# Patient Record
Sex: FEMALE
[De-identification: names, ages, dates, MRNs, and addresses within clinical notes are randomized per-mention and may not be internally consistent; named-entity substitution may affect disease eponyms.]

---
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
## 2023-04-06 ENCOUNTER — APPOINTMENT (RX ONLY)
Dept: URBAN - METROPOLITAN AREA CLINIC 15 | Facility: CLINIC | Age: 23
Setting detail: DERMATOLOGY
End: 2023-04-06

## 2023-04-06 VITALS — HEIGHT: 63 IN | WEIGHT: 115 LBS

## 2023-04-06 DIAGNOSIS — L57.8 OTHER SKIN CHANGES DUE TO CHRONIC EXPOSURE TO NONIONIZING RADIATION: ICD-10-CM

## 2023-04-06 DIAGNOSIS — L71.8 OTHER ROSACEA: ICD-10-CM

## 2023-04-06 DIAGNOSIS — D18.0 HEMANGIOMA: ICD-10-CM

## 2023-04-06 PROBLEM — D18.01 HEMANGIOMA OF SKIN AND SUBCUTANEOUS TISSUE: Status: ACTIVE | Noted: 2023-04-06

## 2023-04-06 PROCEDURE — ? PRESCRIPTION

## 2023-04-06 PROCEDURE — 99203 OFFICE O/P NEW LOW 30 MIN: CPT

## 2023-04-06 PROCEDURE — ? PRESCRIPTION MEDICATION MANAGEMENT

## 2023-04-06 PROCEDURE — ? COUNSELING

## 2023-04-06 RX ORDER — IVERMECTIN 10 MG/G
1 CREAM TOPICAL QAM
Qty: 45 | Refills: 3 | Status: ERX | COMMUNITY
Start: 2023-04-06

## 2023-04-06 RX ORDER — SULFACETAMIDE SODIUM AND SULFUR 100; 50 MG/G; MG/G
1 CREAM TOPICAL QD
Qty: 340 | Refills: 0 | Status: ERX | COMMUNITY
Start: 2023-04-06

## 2023-04-06 RX ORDER — DAPSONE 50 MG/G
1 GEL TOPICAL QHS
Qty: 90 | Refills: 3 | Status: ERX | COMMUNITY
Start: 2023-04-06

## 2023-04-06 RX ORDER — DESONIDE 0.5 MG/ML
1 LOTION TOPICAL BID
Qty: 59 | Refills: 1 | Status: ERX | COMMUNITY
Start: 2023-04-06

## 2023-04-06 RX ADMIN — DESONIDE 1: 0.5 LOTION TOPICAL at 00:00

## 2023-04-06 RX ADMIN — SULFACETAMIDE SODIUM AND SULFUR 1: 100; 50 CREAM TOPICAL at 00:00

## 2023-04-06 RX ADMIN — IVERMECTIN 1: 10 CREAM TOPICAL at 00:00

## 2023-04-06 RX ADMIN — DAPSONE 1: 50 GEL TOPICAL at 00:00

## 2023-04-06 ASSESSMENT — LOCATION DETAILED DESCRIPTION DERM
LOCATION DETAILED: LEFT INFERIOR CENTRAL MALAR CHEEK
LOCATION DETAILED: LEFT FOREHEAD
LOCATION DETAILED: RIGHT PROXIMAL DORSAL FOREARM
LOCATION DETAILED: RIGHT INFERIOR CENTRAL MALAR CHEEK
LOCATION DETAILED: LEFT FOREHEAD
LOCATION DETAILED: LEFT PROXIMAL DORSAL FOREARM
LOCATION DETAILED: RIGHT INFERIOR CENTRAL MALAR CHEEK
LOCATION DETAILED: LEFT INFERIOR CENTRAL MALAR CHEEK

## 2023-04-06 ASSESSMENT — LOCATION SIMPLE DESCRIPTION DERM
LOCATION SIMPLE: LEFT CHEEK
LOCATION SIMPLE: LEFT FOREHEAD
LOCATION SIMPLE: RIGHT CHEEK
LOCATION SIMPLE: LEFT FOREHEAD
LOCATION SIMPLE: LEFT FOREARM
LOCATION SIMPLE: LEFT CHEEK
LOCATION SIMPLE: RIGHT FOREARM
LOCATION SIMPLE: RIGHT CHEEK

## 2023-04-06 ASSESSMENT — LOCATION ZONE DERM
LOCATION ZONE: FACE
LOCATION ZONE: FACE
LOCATION ZONE: ARM

## 2023-04-06 NOTE — PROCEDURE: COUNSELING
Detail Level: Zone
Sunscreen Recommendations: Hydartint  from [facility]
Patient Specific Counseling (Will Not Stick From Patient To Patient): Recommended Cerave SA lotion to arms

## 2023-04-06 NOTE — PROCEDURE: PRESCRIPTION MEDICATION MANAGEMENT
Plan: .\\nConsider oral antibiotic if no improvement.
Render In Strict Bullet Format?: No
Detail Level: Zone
Initiate Treatment: .\\nAM\\n-sulfacetamide sodium-sulfur 10 %-5 % (w/w) topical cleanser Wash face once daily. \\n-Soolantra 1 % topical cream Apply once daily to the face every morning. \\n-desonide 0.05 % lotion Apply a small amount to the face twice daily for 14 days. \\n\\nPM\\n-Aczone 5 % topical gel Apply a small amount to face every night. \\n-desonide 0.05 % lotion Apply a small amount to the face twice daily for 14 days. \\n\\n.

## 2023-05-12 ENCOUNTER — APPOINTMENT (RX ONLY)
Dept: URBAN - METROPOLITAN AREA CLINIC 15 | Facility: CLINIC | Age: 23
Setting detail: DERMATOLOGY
End: 2023-05-12

## 2023-05-12 ENCOUNTER — RX ONLY (OUTPATIENT)
Age: 23
Setting detail: RX ONLY
End: 2023-05-12

## 2023-05-12 VITALS — WEIGHT: 115 LBS | HEIGHT: 63 IN

## 2023-05-12 DIAGNOSIS — L71.8 OTHER ROSACEA: ICD-10-CM

## 2023-05-12 PROCEDURE — ? COUNSELING

## 2023-05-12 PROCEDURE — ? PRESCRIPTION

## 2023-05-12 PROCEDURE — ? ADDITIONAL NOTES

## 2023-05-12 PROCEDURE — 99214 OFFICE O/P EST MOD 30 MIN: CPT

## 2023-05-12 PROCEDURE — ? PRESCRIPTION MEDICATION MANAGEMENT

## 2023-05-12 RX ORDER — OXYMETAZOLINE HYDROCHLORIDE 1 G/100G
1 CREAM TOPICAL QD
Qty: 30 | Refills: 2 | Status: CANCELLED | COMMUNITY
Start: 2023-05-12

## 2023-05-12 RX ORDER — OXYMETAZOLINE HYDROCHLORIDE 1 G/100G
1 CREAM TOPICAL QD
Qty: 30 | Refills: 2 | Status: ERX

## 2023-05-12 RX ADMIN — OXYMETAZOLINE HYDROCHLORIDE 1: 1 CREAM TOPICAL at 00:00

## 2023-05-12 ASSESSMENT — LOCATION ZONE DERM
LOCATION ZONE: FACE
LOCATION ZONE: FACE

## 2023-05-12 ASSESSMENT — LOCATION SIMPLE DESCRIPTION DERM
LOCATION SIMPLE: RIGHT CHEEK
LOCATION SIMPLE: LEFT CHEEK
LOCATION SIMPLE: RIGHT CHEEK
LOCATION SIMPLE: LEFT CHEEK

## 2023-05-12 ASSESSMENT — LOCATION DETAILED DESCRIPTION DERM
LOCATION DETAILED: LEFT INFERIOR CENTRAL MALAR CHEEK
LOCATION DETAILED: LEFT INFERIOR CENTRAL MALAR CHEEK
LOCATION DETAILED: RIGHT INFERIOR CENTRAL MALAR CHEEK
LOCATION DETAILED: RIGHT INFERIOR CENTRAL MALAR CHEEK

## 2023-05-12 NOTE — PROCEDURE: ADDITIONAL NOTES
Render Risk Assessment In Note?: no
Detail Level: Detailed
Additional Notes: Patient was advised to have Azcone medication sent to [facility]. Patient denied sending it to specialty pharmacy and would like for medication to be sent to regular pharmacy.  Patient is aware medication would be more expensive this way

## 2023-05-12 NOTE — PROCEDURE: COUNSELING
Detail Level: Zone
Sunscreen Recommendations: Hydartint  from [facility]

## 2023-05-12 NOTE — PROCEDURE: PRESCRIPTION MEDICATION MANAGEMENT
Plan: .\\n\\nIPL $400 full face each session, 4-5 sessions recommended. \\nMesotherapy + Microneedling $700 each session.
Render In Strict Bullet Format?: No
Detail Level: Zone
Continue Regimen: .\\n\\nAM\\n-sulfacetamide sodium-sulfur 10 %-5 % (w/w) topical cleanser Wash face once daily. \\n-Soolantra 1 % topical cream Apply once daily to the face every morning. \\n\\nPM\\n-Aczone 5 % topical gel Apply a small amount to face every night. \\n-Rhofade 1 % topical cream - Apply a pea size amount to face once daily. \\n\\n.
Discontinue Regimen: .\\n\\n-desonide 0.05 % lotion Apply a small amount to the face twice daily for 14 days. \\n\\n.

## 2023-12-12 ENCOUNTER — APPOINTMENT (RX ONLY)
Dept: URBAN - METROPOLITAN AREA CLINIC 15 | Facility: CLINIC | Age: 23
Setting detail: DERMATOLOGY
End: 2023-12-12

## 2023-12-12 VITALS — WEIGHT: 118 LBS | HEIGHT: 62 IN

## 2023-12-12 DIAGNOSIS — L72.8 OTHER FOLLICULAR CYSTS OF THE SKIN AND SUBCUTANEOUS TISSUE: ICD-10-CM

## 2023-12-12 DIAGNOSIS — L71.8 OTHER ROSACEA: ICD-10-CM

## 2023-12-12 PROCEDURE — 99214 OFFICE O/P EST MOD 30 MIN: CPT | Mod: 25

## 2023-12-12 PROCEDURE — ? COUNSELING

## 2023-12-12 PROCEDURE — ? PRESCRIPTION MEDICATION MANAGEMENT

## 2023-12-12 PROCEDURE — ? INTRALESIONAL KENALOG

## 2023-12-12 PROCEDURE — 11900 INJECT SKIN LESIONS </W 7: CPT

## 2023-12-12 PROCEDURE — ? ADDITIONAL NOTES

## 2023-12-12 ASSESSMENT — LOCATION DETAILED DESCRIPTION DERM
LOCATION DETAILED: LEFT INFERIOR CENTRAL MALAR CHEEK
LOCATION DETAILED: RIGHT INFERIOR CENTRAL MALAR CHEEK
LOCATION DETAILED: LEFT INFERIOR CENTRAL MALAR CHEEK
LOCATION DETAILED: RIGHT INFERIOR CENTRAL MALAR CHEEK
LOCATION DETAILED: RIGHT INFERIOR MEDIAL FOREHEAD

## 2023-12-12 ASSESSMENT — LOCATION ZONE DERM
LOCATION ZONE: FACE
LOCATION ZONE: FACE

## 2023-12-12 ASSESSMENT — LOCATION SIMPLE DESCRIPTION DERM
LOCATION SIMPLE: RIGHT CHEEK
LOCATION SIMPLE: LEFT CHEEK
LOCATION SIMPLE: RIGHT FOREHEAD
LOCATION SIMPLE: RIGHT CHEEK
LOCATION SIMPLE: LEFT CHEEK

## 2023-12-12 NOTE — PROCEDURE: INTRALESIONAL KENALOG
Require Ndc Code?: No
Kenalog Type Of Vial: Multiple Dose
Ndc# For Kenalog Only: 8743-2207-45
Lot # For Kenalog (Optional): [id number]
How Many Mls Were Removed From The 40 Mg/Ml (1ml) Vial When Preparing The Injectable Solution?: 0
Kenalog Preparation: Kenalog
Concentration Of Kenalog Solution Injected (Mg/Ml): 2.5
Validate Note Data When Using Inventory: Yes
Medical Necessity Clause: This procedure was medically necessary because the lesions that were treated were:
Treatment Number (Optional): 1
Total Volume (Ccs): 0.4
Show Inventory Tab: Hide
Expiration Date For Kenalog (Optional): DEC 2025
Detail Level: Detailed
Administered By (Optional): RENA Jansen
Consent: The risks of atrophy were reviewed with the patient.

## 2023-12-12 NOTE — PROCEDURE: ADDITIONAL NOTES
Render Risk Assessment In Note?: no
Detail Level: Detailed
Additional Notes: Patient was advised to have Azcone medication sent to [facility] specialty pharmacy. Patient denied sending it to specialty pharmacy and would like for medication to be sent to regular pharmacy. Patient is aware medication would be more expensive this way

## 2023-12-12 NOTE — PROCEDURE: PRESCRIPTION MEDICATION MANAGEMENT
Plan: .\\nIPL $400 full face each session, 4-5 sessions recommended.
Render In Strict Bullet Format?: No
Detail Level: Zone
Continue Regimen: .\\n\\nAM\\n-sulfacetamide sodium-sulfur 10 %-5 % (w/w) topical cleanser Wash face once daily.\\n-Soolantra 1 % topical cream Apply once daily to the face every morning.\\n\\nPM\\n-Aczone 5 % topical gel Apply a small amount to face every night.\\n-Rhofade 1 % topical cream - Apply a pea size amount to face once daily.\\n\\n.
Discontinue Regimen: .\\n\\n-desonide 0.05 % lotion Apply a small amount to the face twice daily for 14 days.\\n\\n.